# Patient Record
Sex: FEMALE | URBAN - METROPOLITAN AREA
[De-identification: names, ages, dates, MRNs, and addresses within clinical notes are randomized per-mention and may not be internally consistent; named-entity substitution may affect disease eponyms.]

---

## 2023-09-07 ENCOUNTER — LAB OUTSIDE AN ENCOUNTER (OUTPATIENT)
Dept: URBAN - METROPOLITAN AREA CLINIC 54 | Facility: CLINIC | Age: 68
End: 2023-09-07

## 2023-09-07 ENCOUNTER — OFFICE VISIT (OUTPATIENT)
Dept: URBAN - METROPOLITAN AREA CLINIC 54 | Facility: CLINIC | Age: 68
End: 2023-09-07
Payer: MEDICARE

## 2023-09-07 VITALS
TEMPERATURE: 97.3 F | DIASTOLIC BLOOD PRESSURE: 60 MMHG | WEIGHT: 159 LBS | HEIGHT: 62 IN | HEART RATE: 96 BPM | SYSTOLIC BLOOD PRESSURE: 113 MMHG | BODY MASS INDEX: 29.26 KG/M2

## 2023-09-07 DIAGNOSIS — R15.9 FULL INCONTINENCE OF FECES: ICD-10-CM

## 2023-09-07 DIAGNOSIS — K21.9 ACID REFLUX: ICD-10-CM

## 2023-09-07 DIAGNOSIS — R63.0 LOSS OF APPETITE: ICD-10-CM

## 2023-09-07 DIAGNOSIS — R11.0 NAUSEA: ICD-10-CM

## 2023-09-07 PROBLEM — 72042002: Status: ACTIVE | Noted: 2023-09-07

## 2023-09-07 PROBLEM — 235595009: Status: ACTIVE | Noted: 2023-09-07

## 2023-09-07 PROBLEM — 79890006: Status: ACTIVE | Noted: 2023-09-07

## 2023-09-07 PROCEDURE — 99244 OFF/OP CNSLTJ NEW/EST MOD 40: CPT

## 2023-09-07 PROCEDURE — 99204 OFFICE O/P NEW MOD 45 MIN: CPT

## 2023-09-07 RX ORDER — SODIUM, POTASSIUM,MAG SULFATES 17.5-3.13G
354 ML AS DIRECTED SOLUTION, RECONSTITUTED, ORAL ORAL
Qty: 354 ML | Refills: 0 | OUTPATIENT
Start: 2023-09-07 | End: 2023-09-08

## 2023-09-07 RX ORDER — METFORMIN HYDROCHLORIDE 850 MG/1
1 TABLET WITH A MEAL TABLET, FILM COATED ORAL TWICE A DAY
Status: ACTIVE | COMMUNITY

## 2023-09-07 RX ORDER — ROSUVASTATIN CALCIUM 20 MG/1
1 TABLET TABLET, COATED ORAL ONCE A DAY
Status: ACTIVE | COMMUNITY

## 2023-09-07 RX ORDER — FLUOXETINE 10 MG/1
1 CAPSULE CAPSULE ORAL ONCE A DAY
Status: ACTIVE | COMMUNITY

## 2023-09-07 RX ORDER — OMEPRAZOLE 20 MG/1
1 CAPSULE 30 MINUTES BEFORE MORNING MEAL CAPSULE, DELAYED RELEASE ORAL ONCE A DAY
Qty: 30 | Refills: 1 | OUTPATIENT
Start: 2023-09-07

## 2023-09-07 RX ORDER — GLIPIZIDE 10 MG/1
1 TABLET WITH BREAKFAST TABLET, EXTENDED RELEASE ORAL ONCE A DAY
Status: ACTIVE | COMMUNITY

## 2023-09-07 RX ORDER — LISINOPRIL 20 MG/1
1 TABLET TABLET ORAL ONCE A DAY
Status: ACTIVE | COMMUNITY

## 2023-09-07 NOTE — HPI-TODAY'S VISIT:
9/7/23: Patient was referred by Dr. Semaj Kim for incontinence of feces. A copy of this document will be sent to the provider. Pt is a 69 yo female with PMH of HLD, DM, HTN who presents with fecal incontinence, GERD, and loss off appetite. Pt states the decreased appetite has been present for the last 2 years. Only hungry for breakfast, then does not want to eat the rest of the day. Has postprandial epigastric discomfort and nausea but no vomiting. Reports more heartburn/reflux. No dysphagia/odynophagia. Not on any PPI. Occasional NSAIDs. No significant change in bowel habits, but pt has had a few episodes of passive incontinence in the past few months. Pt was unaware she had a BM until she noticed wetness and checked. Has fecal leakage of loose stool without urgency. Denies hematochezia or melena. Denies unintentional weight loss. No cardiopulmonary disease. No family hx of CRC. No prior EGD or cscope.

## 2023-09-15 ENCOUNTER — TELEPHONE ENCOUNTER (OUTPATIENT)
Dept: URBAN - METROPOLITAN AREA CLINIC 54 | Facility: CLINIC | Age: 68
End: 2023-09-15

## 2023-09-15 RX ORDER — POLYETHYLENE GLYCOL-3350 AND ELECTROLYTES 236; 6.74; 5.86; 2.97; 22.74 G/274.31G; G/274.31G; G/274.31G; G/274.31G; G/274.31G
AS DIRECTED POWDER, FOR SOLUTION ORAL
Qty: 1 BOTTLE | Refills: 0 | OUTPATIENT
Start: 2023-09-15 | End: 2023-09-16

## 2023-09-27 ENCOUNTER — OUT OF OFFICE VISIT (OUTPATIENT)
Dept: URBAN - METROPOLITAN AREA SURGERY CENTER 14 | Facility: SURGERY CENTER | Age: 68
End: 2023-09-27
Payer: MEDICARE

## 2023-09-27 ENCOUNTER — CLAIMS CREATED FROM THE CLAIM WINDOW (OUTPATIENT)
Dept: URBAN - METROPOLITAN AREA CLINIC 4 | Facility: CLINIC | Age: 68
End: 2023-09-27
Payer: MEDICARE

## 2023-09-27 DIAGNOSIS — K64.8 OTHER HEMORRHOIDS: ICD-10-CM

## 2023-09-27 DIAGNOSIS — D12.3 ADENOMA OF TRANSVERSE COLON: ICD-10-CM

## 2023-09-27 DIAGNOSIS — K90.0 CELIAC DISEASE: ICD-10-CM

## 2023-09-27 DIAGNOSIS — R11.0 NAUSEA: ICD-10-CM

## 2023-09-27 DIAGNOSIS — K90.0 ACD (ADULT CELIAC DISEASE): ICD-10-CM

## 2023-09-27 DIAGNOSIS — R15.9 ANAL SPHINCTER INCONTINENCE: ICD-10-CM

## 2023-09-27 DIAGNOSIS — K29.60 ADENOPAPILLOMATOSIS GASTRICA: ICD-10-CM

## 2023-09-27 DIAGNOSIS — D12.3 ADENOMATOUS POLYP OF TRANSVERSE COLON: ICD-10-CM

## 2023-09-27 DIAGNOSIS — R11.0 AM NAUSEA: ICD-10-CM

## 2023-09-27 DIAGNOSIS — B96.81 BACTERIAL INFECTION DUE TO H. PYLORI: ICD-10-CM

## 2023-09-27 PROCEDURE — 43239 EGD BIOPSY SINGLE/MULTIPLE: CPT | Performed by: INTERNAL MEDICINE

## 2023-09-27 PROCEDURE — 00813 ANES UPR LWR GI NDSC PX: CPT | Performed by: NURSE ANESTHETIST, CERTIFIED REGISTERED

## 2023-09-27 PROCEDURE — 88305 TISSUE EXAM BY PATHOLOGIST: CPT | Performed by: PATHOLOGY

## 2023-09-27 PROCEDURE — G8907 PT DOC NO EVENTS ON DISCHARG: HCPCS | Performed by: INTERNAL MEDICINE

## 2023-09-27 PROCEDURE — 88342 IMHCHEM/IMCYTCHM 1ST ANTB: CPT | Performed by: PATHOLOGY

## 2023-09-27 PROCEDURE — 45385 COLONOSCOPY W/LESION REMOVAL: CPT | Performed by: INTERNAL MEDICINE

## 2023-09-27 RX ORDER — ROSUVASTATIN CALCIUM 20 MG/1
1 TABLET TABLET, COATED ORAL ONCE A DAY
Status: ACTIVE | COMMUNITY

## 2023-09-27 RX ORDER — FLUOXETINE 10 MG/1
1 CAPSULE CAPSULE ORAL ONCE A DAY
Status: ACTIVE | COMMUNITY

## 2023-09-27 RX ORDER — LISINOPRIL 20 MG/1
1 TABLET TABLET ORAL ONCE A DAY
Status: ACTIVE | COMMUNITY

## 2023-09-27 RX ORDER — GLIPIZIDE 10 MG/1
1 TABLET WITH BREAKFAST TABLET, EXTENDED RELEASE ORAL ONCE A DAY
Status: ACTIVE | COMMUNITY

## 2023-09-27 RX ORDER — METFORMIN HYDROCHLORIDE 850 MG/1
1 TABLET WITH A MEAL TABLET, FILM COATED ORAL TWICE A DAY
Status: ACTIVE | COMMUNITY

## 2023-09-27 RX ORDER — OMEPRAZOLE 20 MG/1
1 CAPSULE 30 MINUTES BEFORE MORNING MEAL CAPSULE, DELAYED RELEASE ORAL ONCE A DAY
Qty: 30 | Refills: 1 | Status: ACTIVE | COMMUNITY
Start: 2023-09-07

## 2023-10-04 ENCOUNTER — TELEPHONE ENCOUNTER (OUTPATIENT)
Dept: URBAN - METROPOLITAN AREA CLINIC 54 | Facility: CLINIC | Age: 68
End: 2023-10-04

## 2023-10-04 PROBLEM — 67023009: Status: ACTIVE | Noted: 2023-10-04

## 2023-10-04 RX ORDER — ROSUVASTATIN CALCIUM 20 MG/1
1 TABLET TABLET, COATED ORAL ONCE A DAY
Status: ACTIVE | COMMUNITY

## 2023-10-04 RX ORDER — OMEPRAZOLE 20 MG/1
1 CAPSULE 30 MINUTES BEFORE MORNING MEAL CAPSULE, DELAYED RELEASE ORAL ONCE A DAY
Qty: 30 | Refills: 1 | Status: ACTIVE | COMMUNITY
Start: 2023-09-07

## 2023-10-04 RX ORDER — TETRACYCLINE HYDROCHLORIDE 500 MG/1
1 CAPSULE ON AN EMPTY STOMACH CAPSULE ORAL
Qty: 56 CAPSULE | Refills: 0 | OUTPATIENT
Start: 2023-10-04 | End: 2023-10-18

## 2023-10-04 RX ORDER — LISINOPRIL 20 MG/1
1 TABLET TABLET ORAL ONCE A DAY
Status: ACTIVE | COMMUNITY

## 2023-10-04 RX ORDER — BISMUTH SUBSALICYLATE 262 MG
2 TABLETS TABLET,CHEWABLE ORAL
Qty: 112 TABLET | Refills: 0 | OUTPATIENT
Start: 2023-10-04 | End: 2023-10-18

## 2023-10-04 RX ORDER — OMEPRAZOLE 20 MG/1
1 CAPSULE 30 MINUTES BEFORE MEAL CAPSULE, DELAYED RELEASE ORAL TWICE A DAY
Qty: 28 | Refills: 0 | OUTPATIENT
Start: 2023-10-04

## 2023-10-04 RX ORDER — METRONIDAZOLE 250 MG/1
1 TABLET TABLET ORAL
Qty: 56 TABLET | Refills: 0 | OUTPATIENT
Start: 2023-10-04 | End: 2023-10-18

## 2023-10-04 RX ORDER — GLIPIZIDE 10 MG/1
1 TABLET WITH BREAKFAST TABLET, EXTENDED RELEASE ORAL ONCE A DAY
Status: ACTIVE | COMMUNITY

## 2023-10-04 RX ORDER — FLUOXETINE 10 MG/1
1 CAPSULE CAPSULE ORAL ONCE A DAY
Status: ACTIVE | COMMUNITY

## 2023-10-04 RX ORDER — METFORMIN HYDROCHLORIDE 850 MG/1
1 TABLET WITH A MEAL TABLET, FILM COATED ORAL TWICE A DAY
Status: ACTIVE | COMMUNITY

## 2023-10-05 ENCOUNTER — TELEPHONE ENCOUNTER (OUTPATIENT)
Dept: URBAN - METROPOLITAN AREA CLINIC 54 | Facility: CLINIC | Age: 68
End: 2023-10-05

## 2023-10-05 ENCOUNTER — LAB OUTSIDE AN ENCOUNTER (OUTPATIENT)
Dept: URBAN - METROPOLITAN AREA CLINIC 54 | Facility: CLINIC | Age: 68
End: 2023-10-05

## 2023-10-09 ENCOUNTER — TELEPHONE ENCOUNTER (OUTPATIENT)
Dept: URBAN - METROPOLITAN AREA CLINIC 54 | Facility: CLINIC | Age: 68
End: 2023-10-09

## 2023-10-18 ENCOUNTER — OFFICE VISIT (OUTPATIENT)
Dept: URBAN - METROPOLITAN AREA CLINIC 54 | Facility: CLINIC | Age: 68
End: 2023-10-18

## 2023-11-06 ENCOUNTER — ERX REFILL RESPONSE (OUTPATIENT)
Dept: URBAN - METROPOLITAN AREA CLINIC 54 | Facility: CLINIC | Age: 68
End: 2023-11-06

## 2023-11-06 RX ORDER — OMEPRAZOLE 20 MG/1
1 CAPSULE 30 MINUTES BEFORE MORNING MEAL CAPSULE, DELAYED RELEASE ORAL ONCE A DAY
Qty: 30 | Refills: 1 | OUTPATIENT

## 2023-11-06 RX ORDER — OMEPRAZOLE 20 MG/1
TAKE 1 CAPSULE BY MOUTH DAILY 30 MINUTES BEFORE MORNING MEAL CAPSULE, DELAYED RELEASE ORAL
Qty: 30 CAPSULE | Refills: 0 | OUTPATIENT

## 2023-11-16 ENCOUNTER — OFFICE VISIT (OUTPATIENT)
Dept: URBAN - METROPOLITAN AREA CLINIC 54 | Facility: CLINIC | Age: 68
End: 2023-11-16
Payer: MEDICARE

## 2023-11-16 VITALS
TEMPERATURE: 97.2 F | HEART RATE: 82 BPM | HEIGHT: 62 IN | SYSTOLIC BLOOD PRESSURE: 101 MMHG | BODY MASS INDEX: 28.89 KG/M2 | WEIGHT: 157 LBS | DIASTOLIC BLOOD PRESSURE: 62 MMHG

## 2023-11-16 DIAGNOSIS — K31.9 MUCOSAL ABNORMALITY OF DUODENUM: ICD-10-CM

## 2023-11-16 DIAGNOSIS — A04.8 BACTERIAL INFECTION DUE TO H. PYLORI: ICD-10-CM

## 2023-11-16 DIAGNOSIS — R15.9 FULL INCONTINENCE OF FECES: ICD-10-CM

## 2023-11-16 DIAGNOSIS — K21.9 ACID REFLUX: ICD-10-CM

## 2023-11-16 PROCEDURE — 99213 OFFICE O/P EST LOW 20 MIN: CPT

## 2023-11-16 PROCEDURE — 91065 BREATH HYDROGEN/METHANE TEST: CPT

## 2023-11-16 RX ORDER — METFORMIN HYDROCHLORIDE 850 MG/1
1 TABLET WITH A MEAL TABLET, FILM COATED ORAL TWICE A DAY
Status: ACTIVE | COMMUNITY

## 2023-11-16 RX ORDER — FLUOXETINE 10 MG/1
1 CAPSULE CAPSULE ORAL ONCE A DAY
Status: ACTIVE | COMMUNITY

## 2023-11-16 RX ORDER — GLIPIZIDE 10 MG/1
1 TABLET WITH BREAKFAST TABLET, EXTENDED RELEASE ORAL ONCE A DAY
Status: ACTIVE | COMMUNITY

## 2023-11-16 RX ORDER — LISINOPRIL 20 MG/1
1 TABLET TABLET ORAL ONCE A DAY
Status: ACTIVE | COMMUNITY

## 2023-11-16 RX ORDER — OMEPRAZOLE 20 MG/1
TAKE 1 CAPSULE BY MOUTH DAILY 30 MINUTES BEFORE MORNING MEAL CAPSULE, DELAYED RELEASE ORAL
Qty: 30 CAPSULE | Refills: 0 | Status: ON HOLD | COMMUNITY

## 2023-11-16 RX ORDER — ROSUVASTATIN CALCIUM 20 MG/1
1 TABLET TABLET, COATED ORAL ONCE A DAY
Status: ACTIVE | COMMUNITY

## 2023-11-16 NOTE — HPI-TODAY'S VISIT:
9/7/23: Patient was referred by Dr. Semaj Kim for incontinence of feces. A copy of this document will be sent to the provider. Pt is a 67 yo female with PMH of HLD, DM, HTN who presents with fecal incontinence, GERD, and loss off appetite. Pt states the decreased appetite has been present for the last 2 years. Only hungry for breakfast, then does not want to eat the rest of the day. Has postprandial epigastric discomfort and nausea but no vomiting. Reports more heartburn/reflux. No dysphagia/odynophagia. Not on any PPI. Occasional NSAIDs. No significant change in bowel habits, but pt has had a few episodes of passive incontinence in the past few months. Pt was unaware she had a BM until she noticed wetness and checked. Has fecal leakage of loose stool without urgency. Denies hematochezia or melena. Denies unintentional weight loss. No cardiopulmonary disease. No family hx of CRC. No prior EGD or cscope.  11/16/23 Follow up: Pt presents for EGD/cscope follow up. EGD was significant for active H pylori infection. Completed quadruple therapy about 3 weeks ago and is feeling better. Need to confirm eradication. Duodenal bx showed increased intraepithealial lymphocytes with normal villous architecture (Marsh type 1). Pending celiac serologies. Colonoscopy was significant for one TA and decreased anal sphincter tone. Pt was referred for anorectal manometry but has not scheduled. Has not had any recurrent episodes of incontinence. Having about 2-3 loose stools per day.  EGD 9/27/23: - The examined portions of the nasopharynx, oropharynx and larynx were normal. - Normal esophagus. - Normal examined duodenum. Biopsied. - Biopsies were taken from the stomach with a cold forceps for Helicobacter pylori testing. Biopsy:  - Stomach: Active H pylori gastritis. H pylori organisms identified. No evidence of intestinal metaplasia. Negative for dysplasia and malignancy. - Duodenum: Increased numbers of intraepithealial lymphocytes with normal villous architecture (Marsh type 1) . Negative for Infectious Organisms, Dysplasia or Malignancy.  Colonoscopy: - Hemorrhoids found on perianal exam. - Decreased sphincter tone found on digital rectal exam. - Internal hemorrhoids. - One 4 mm polyp in the transverse colon, removed with a cold snare. Resected and retrieved. - The examined portion of the ileum was normal. Biopsy: Tubular adenoma.

## 2023-11-21 LAB
(TTG) AB, IGA: <1
(TTG) AB, IGG: <1
ANTIGLIADIN ABS, IGA: <1
GLIADIN (DEAMIDATED) AB (IGG): <1
H PYLORI BREATH TEST: DETECTED
IMMUNOGLOBULIN A: 267

## 2023-11-30 ENCOUNTER — TELEPHONE ENCOUNTER (OUTPATIENT)
Dept: URBAN - METROPOLITAN AREA CLINIC 54 | Facility: CLINIC | Age: 68
End: 2023-11-30

## 2023-11-30 RX ORDER — AMOXICILLIN 250 MG/1
3 CAPSULES CAPSULE ORAL THREE TIMES A DAY
Qty: 126 CAPSULE | Refills: 0 | OUTPATIENT
Start: 2023-11-30 | End: 2023-12-14

## 2023-11-30 RX ORDER — LEVOFLOXACIN 500 MG/1
1 TABLET TABLET, FILM COATED ORAL ONCE A DAY
Qty: 14 TABLET | Refills: 0 | OUTPATIENT
Start: 2023-11-30 | End: 2023-12-14

## 2023-11-30 RX ORDER — OMEPRAZOLE 40 MG/1
1 CAPSULE 30 MINUTES BEFORE MEAL CAPSULE, DELAYED RELEASE ORAL TWICE A DAY
Qty: 28 | Refills: 0 | OUTPATIENT
Start: 2023-11-30

## 2024-01-12 ENCOUNTER — OFFICE VISIT (OUTPATIENT)
Dept: URBAN - METROPOLITAN AREA CLINIC 54 | Facility: CLINIC | Age: 69
End: 2024-01-12
Payer: MEDICARE

## 2024-01-12 ENCOUNTER — DASHBOARD ENCOUNTERS (OUTPATIENT)
Age: 69
End: 2024-01-12

## 2024-01-12 VITALS
HEART RATE: 81 BPM | HEIGHT: 62 IN | DIASTOLIC BLOOD PRESSURE: 63 MMHG | BODY MASS INDEX: 28.71 KG/M2 | WEIGHT: 156 LBS | TEMPERATURE: 97.2 F | SYSTOLIC BLOOD PRESSURE: 118 MMHG

## 2024-01-12 DIAGNOSIS — R15.9 INCONTINENCE OF FECES, UNSPECIFIED FECAL INCONTINENCE TYPE: ICD-10-CM

## 2024-01-12 DIAGNOSIS — R63.0 LOSS OF APPETITE: ICD-10-CM

## 2024-01-12 DIAGNOSIS — K31.9 MUCOSAL ABNORMALITY OF DUODENUM: ICD-10-CM

## 2024-01-12 DIAGNOSIS — A04.8 H. PYLORI INFECTION: ICD-10-CM

## 2024-01-12 PROCEDURE — 99214 OFFICE O/P EST MOD 30 MIN: CPT

## 2024-01-12 RX ORDER — METFORMIN HYDROCHLORIDE 850 MG/1
1 TABLET WITH A MEAL TABLET, FILM COATED ORAL TWICE A DAY
Status: ON HOLD | COMMUNITY

## 2024-01-12 RX ORDER — ROSUVASTATIN CALCIUM 20 MG/1
1 TABLET TABLET, COATED ORAL ONCE A DAY
Status: ACTIVE | COMMUNITY

## 2024-01-12 RX ORDER — GLIPIZIDE 10 MG/1
1 TABLET WITH BREAKFAST TABLET, EXTENDED RELEASE ORAL ONCE A DAY
Status: ACTIVE | COMMUNITY

## 2024-01-12 RX ORDER — OMEPRAZOLE 40 MG/1
1 CAPSULE 30 MINUTES BEFORE MEAL CAPSULE, DELAYED RELEASE ORAL TWICE A DAY
Qty: 28 | Refills: 0 | Status: ON HOLD | COMMUNITY
Start: 2023-11-30

## 2024-01-12 RX ORDER — FLUOXETINE 10 MG/1
1 CAPSULE CAPSULE ORAL ONCE A DAY
Status: ACTIVE | COMMUNITY

## 2024-01-12 RX ORDER — LISINOPRIL 20 MG/1
1 TABLET TABLET ORAL ONCE A DAY
Status: ACTIVE | COMMUNITY

## 2024-01-12 NOTE — HPI-TODAY'S VISIT:
9/7/23: Patient was referred by Dr. Semaj Kim for incontinence of feces. A copy of this document will be sent to the provider. Pt is a 67 yo female with PMH of HLD, DM, HTN who presents with fecal incontinence, GERD, and loss off appetite. Pt states the decreased appetite has been present for the last 2 years. Only hungry for breakfast, then does not want to eat the rest of the day. Has postprandial epigastric discomfort and nausea but no vomiting. Reports more heartburn/reflux. No dysphagia/odynophagia. Not on any PPI. Occasional NSAIDs. No significant change in bowel habits, but pt has had a few episodes of passive incontinence in the past few months. Pt was unaware she had a BM until she noticed wetness and checked. Has fecal leakage of loose stool without urgency. Denies hematochezia or melena. Denies unintentional weight loss. No cardiopulmonary disease. No family hx of CRC. No prior EGD or cscope.  11/16/23 Follow up: Pt presents for EGD/cscope follow up. EGD was significant for active H pylori infection. Completed quadruple therapy about 3 weeks ago and is feeling better. Need to confirm eradication. Duodenal bx showed increased intraepithealial lymphocytes with normal villous architecture (Marsh type 1). Pending celiac serologies. Colonoscopy was significant for one TA and decreased anal sphincter tone. Pt was referred for anorectal manometry but has not scheduled. Has not had any recurrent episodes of incontinence. Having about 2-3 loose stools per day.  EGD 9/27/23: - The examined portions of the nasopharynx, oropharynx and larynx were normal. - Normal esophagus. - Normal examined duodenum. Biopsied. - Biopsies were taken from the stomach with a cold forceps for Helicobacter pylori testing. Biopsy:  - Stomach: Active H pylori gastritis. H pylori organisms identified. No evidence of intestinal metaplasia. Negative for dysplasia and malignancy. - Duodenum: Increased numbers of intraepithealial lymphocytes with normal villous architecture (Marsh type 1) . Negative for Infectious Organisms, Dysplasia or Malignancy.  Colonoscopy: - Hemorrhoids found on perianal exam. - Decreased sphincter tone found on digital rectal exam. - Internal hemorrhoids. - One 4 mm polyp in the transverse colon, removed with a cold snare. Resected and retrieved. - The examined portion of the ileum was normal. Biopsy: Tubular adenoma.  1/12/24 Follow up: Pt presents for follow up of persistent H pylori. Breath test to confirm eradication was positive after completing bismuth quadruple therapy (tetracycline/metronidazole/omeprazole/bismuth). Pt was retreated with levofloxacin/amox/lanzoprazole. Celiac serologies were negative. Feeling well without complaints.

## 2024-01-18 LAB
DQ2 (DQA1 0501/0505,DQB1 02XX): NEGATIVE
DQ8 (DQA1 03XX, DQB1 0302): NEGATIVE
H PYLORI BREATH TEST: NOT DETECTED
HLA DQB1*: 605
HLA VARIANTS DETECTED: HLA DQA1*: 1
INTERPRETATION: (no result)
RESULTS REVIEWED BY:: (no result)